# Patient Record
Sex: MALE
[De-identification: names, ages, dates, MRNs, and addresses within clinical notes are randomized per-mention and may not be internally consistent; named-entity substitution may affect disease eponyms.]

---

## 2021-03-04 ENCOUNTER — APPOINTMENT (OUTPATIENT)
Dept: PULMONOLOGY | Facility: CLINIC | Age: 46
End: 2021-03-04
Payer: SELF-PAY

## 2021-03-04 VITALS
DIASTOLIC BLOOD PRESSURE: 91 MMHG | RESPIRATION RATE: 16 BRPM | HEIGHT: 66 IN | WEIGHT: 213 LBS | SYSTOLIC BLOOD PRESSURE: 142 MMHG | BODY MASS INDEX: 34.23 KG/M2 | OXYGEN SATURATION: 98 % | TEMPERATURE: 99 F | HEART RATE: 89 BPM

## 2021-03-04 DIAGNOSIS — Z78.9 OTHER SPECIFIED HEALTH STATUS: ICD-10-CM

## 2021-03-04 DIAGNOSIS — Z87.891 PERSONAL HISTORY OF NICOTINE DEPENDENCE: ICD-10-CM

## 2021-03-04 PROBLEM — Z00.00 ENCOUNTER FOR PREVENTIVE HEALTH EXAMINATION: Status: ACTIVE | Noted: 2021-03-04

## 2021-03-04 PROCEDURE — 99203 OFFICE O/P NEW LOW 30 MIN: CPT

## 2021-03-04 RX ORDER — MONTELUKAST 10 MG/1
10 TABLET, FILM COATED ORAL
Qty: 90 | Refills: 3 | Status: ACTIVE | COMMUNITY
Start: 2021-03-04 | End: 1900-01-01

## 2021-03-04 RX ORDER — METHYLPREDNISOLONE 4 MG/1
4 TABLET ORAL
Qty: 1 | Refills: 0 | Status: ACTIVE | COMMUNITY
Start: 2021-03-04 | End: 1900-01-01

## 2021-03-20 PROBLEM — Z78.9 NO KNOWN PROBLEMS: Status: RESOLVED | Noted: 2021-03-20 | Resolved: 2021-03-20

## 2021-03-20 PROBLEM — Z87.891 FORMER LIGHT TOBACCO SMOKER: Status: ACTIVE | Noted: 2021-03-20

## 2021-03-20 NOTE — HISTORY OF PRESENT ILLNESS
[Former] : former [< 30 pack-years] : < 30 pack-years [TextBox_4] : 46 yo male presents for evaluation of "breathing problems" associated with nasal congestion and itch. He also complains of PRN productive cough without SOB or wheezing. He has pollen allergies. [YearQuit] : 2010 [TextBox_29] : Denies snoring, daytime somnolence, apneic episodes, AM headaches

## 2021-03-20 NOTE — DISCUSSION/SUMMARY
[FreeTextEntry1] : 46 yo male with complaints related to seasonal allergic rhinitis. Medrol pack prescribed with daily montelukast and PRN nasal saline. Use of OTC antihistamine PRN also recommended. He is to follow up with his PMD as before. A Slovak speaking  was used.

## 2021-03-20 NOTE — REVIEW OF SYSTEMS
[Nasal Congestion] : nasal congestion [Postnasal Drip] : postnasal drip [Negative] : Endocrine [Dry Eyes] : no dry eyes [Cough] : cough [Sputum] : sputum [Dyspnea] : no dyspnea [Wheezing] : no wheezing

## 2024-01-15 ENCOUNTER — APPOINTMENT (OUTPATIENT)
Dept: PULMONOLOGY | Facility: CLINIC | Age: 49
End: 2024-01-15
Payer: SELF-PAY

## 2024-01-15 VITALS
TEMPERATURE: 97.6 F | OXYGEN SATURATION: 97 % | HEART RATE: 74 BPM | BODY MASS INDEX: 31.96 KG/M2 | SYSTOLIC BLOOD PRESSURE: 138 MMHG | DIASTOLIC BLOOD PRESSURE: 80 MMHG | WEIGHT: 198 LBS

## 2024-01-15 DIAGNOSIS — J31.0 CHRONIC RHINITIS: ICD-10-CM

## 2024-01-15 DIAGNOSIS — R05.8 OTHER SPECIFIED COUGH: ICD-10-CM

## 2024-01-15 PROCEDURE — 99213 OFFICE O/P EST LOW 20 MIN: CPT

## 2024-01-15 RX ORDER — FLUTICASONE PROPIONATE AND SALMETEROL 250; 50 UG/1; UG/1
250-50 POWDER RESPIRATORY (INHALATION)
Qty: 1 | Refills: 0 | Status: ACTIVE | COMMUNITY
Start: 2024-01-15 | End: 1900-01-01

## 2024-01-15 RX ORDER — MONTELUKAST 10 MG/1
10 TABLET, FILM COATED ORAL
Qty: 1 | Refills: 0 | Status: ACTIVE | COMMUNITY
Start: 2024-01-15 | End: 1900-01-01

## 2024-01-15 RX ORDER — FLUTICASONE PROPIONATE 50 UG/1
50 SPRAY, METERED NASAL DAILY
Qty: 1 | Refills: 0 | Status: ACTIVE | COMMUNITY
Start: 2024-01-15 | End: 1900-01-01

## 2024-01-16 PROBLEM — R05.8 ALLERGIC COUGH: Status: ACTIVE | Noted: 2021-03-20

## 2024-01-16 PROBLEM — J31.0 CHRONIC RHINITIS: Status: ACTIVE | Noted: 2021-03-04

## 2024-01-16 NOTE — DISCUSSION/SUMMARY
[FreeTextEntry1] : Impression:  Allergic rhinitis with post nasal drip and a cough - no systemic signs of infection  Recommend:  Flonase Wixela  Montelukast  To call me if not feeling better with the above Follow up in one month - obtain PFT

## 2024-01-16 NOTE — REVIEW OF SYSTEMS
[Fever] : no fever [Fatigue] : no fatigue [Chills] : no chills [Ear Disturbance] : no ear disturbance [Epistaxis] : no epistaxis [Sore Throat] : no sore throat [Nasal Congestion] : nasal congestion [Postnasal Drip] : postnasal drip [Dry Mouth] : no dry mouth [Sinus Problems] : sinus problems [Poor Dentition] : no poor dentition [Cough] : cough [Hemoptysis] : no hemoptysis [Sputum] : no sputum [Dyspnea] : no dyspnea [Wheezing] : no wheezing [Chest Discomfort] : no chest discomfort [Edema] : no edema [Hay Fever] : no hay fever [GERD] : no gerd [Myalgias] : no myalgias [Back Pain] : no back pain [Rash] : no rash [Headache] : no headache [Anxiety] : no anxiety [Diabetes] : no diabetes

## 2024-01-16 NOTE — PHYSICAL EXAM
[No Acute Distress] : no acute distress [Normal Oropharynx] : normal oropharynx [Normal Appearance] : normal appearance [No Neck Mass] : no neck mass [Normal Rate/Rhythm] : normal rate/rhythm [Normal S1, S2] : normal s1, s2 [No Resp Distress] : no resp distress [Rhonchi] : rhonchi [No HSM] : no hsm [Normal Gait] : normal gait [No Clubbing] : no clubbing [No Edema] : no edema [Normal Turgor] : normal turgor [No Focal Deficits] : no focal deficits [No Motor Deficits] : no motor deficits [Oriented x3] : oriented x3 [Normal Affect] : normal affect

## 2024-01-16 NOTE — HISTORY OF PRESENT ILLNESS
[Never] : never [TextBox_4] : C/O cough, sinus congestion, post nasal drip, and a cough. No fever, chills or sweats. No dysphagia. No SOB or wheezing. He never smoked. No prior history of pulmonary disease. Works as a  in a school. No sick contacts.  [Awakes Unrefreshed] : does not awaken unrefreshed

## 2024-02-21 ENCOUNTER — APPOINTMENT (OUTPATIENT)
Dept: PULMONOLOGY | Facility: CLINIC | Age: 49
End: 2024-02-21